# Patient Record
Sex: MALE | Race: WHITE | URBAN - METROPOLITAN AREA
[De-identification: names, ages, dates, MRNs, and addresses within clinical notes are randomized per-mention and may not be internally consistent; named-entity substitution may affect disease eponyms.]

---

## 2021-02-18 RX ORDER — SODIUM CHLORIDE 9 MG/ML
1000 INJECTION, SOLUTION INTRAVENOUS
Refills: 0 | Status: DISCONTINUED | OUTPATIENT
Start: 2021-02-19 | End: 2021-02-19

## 2021-02-18 RX ORDER — OXYCODONE HYDROCHLORIDE 5 MG/1
5 TABLET ORAL ONCE
Refills: 0 | Status: DISCONTINUED | OUTPATIENT
Start: 2021-02-19 | End: 2021-02-19

## 2021-02-18 RX ORDER — FENTANYL CITRATE 50 UG/ML
50 INJECTION INTRAVENOUS
Refills: 0 | Status: DISCONTINUED | OUTPATIENT
Start: 2021-02-19 | End: 2021-02-19

## 2021-02-18 RX ORDER — ONDANSETRON 8 MG/1
4 TABLET, FILM COATED ORAL ONCE
Refills: 0 | Status: DISCONTINUED | OUTPATIENT
Start: 2021-02-19 | End: 2021-02-19

## 2021-02-19 ENCOUNTER — OUTPATIENT (OUTPATIENT)
Dept: INPATIENT UNIT | Facility: HOSPITAL | Age: 51
LOS: 1 days | Discharge: HOME | End: 2021-02-19
Payer: COMMERCIAL

## 2021-02-19 ENCOUNTER — RESULT REVIEW (OUTPATIENT)
Age: 51
End: 2021-02-19

## 2021-02-19 VITALS
TEMPERATURE: 97 F | DIASTOLIC BLOOD PRESSURE: 80 MMHG | SYSTOLIC BLOOD PRESSURE: 142 MMHG | WEIGHT: 169.98 LBS | HEART RATE: 81 BPM | HEIGHT: 75 IN | OXYGEN SATURATION: 100 % | RESPIRATION RATE: 14 BRPM

## 2021-02-19 VITALS
DIASTOLIC BLOOD PRESSURE: 64 MMHG | SYSTOLIC BLOOD PRESSURE: 111 MMHG | TEMPERATURE: 98 F | RESPIRATION RATE: 18 BRPM | HEART RATE: 83 BPM | OXYGEN SATURATION: 100 %

## 2021-02-19 DIAGNOSIS — M75.42 IMPINGEMENT SYNDROME OF LEFT SHOULDER: ICD-10-CM

## 2021-02-19 DIAGNOSIS — M25.9 JOINT DISORDER, UNSPECIFIED: Chronic | ICD-10-CM

## 2021-02-19 PROCEDURE — 88304 TISSUE EXAM BY PATHOLOGIST: CPT

## 2021-02-19 PROCEDURE — 88304 TISSUE EXAM BY PATHOLOGIST: CPT | Mod: 26

## 2021-02-19 PROCEDURE — C1713: CPT

## 2021-02-19 RX ORDER — APREPITANT 80 MG/1
40 CAPSULE ORAL ONCE
Refills: 0 | Status: COMPLETED | OUTPATIENT
Start: 2021-02-19 | End: 2021-02-19

## 2021-02-19 RX ORDER — ONDANSETRON 8 MG/1
1 TABLET, FILM COATED ORAL
Qty: 15 | Refills: 0
Start: 2021-02-19 | End: 2021-02-23

## 2021-02-19 RX ORDER — ASPIRIN/CALCIUM CARB/MAGNESIUM 324 MG
1 TABLET ORAL
Qty: 14 | Refills: 0
Start: 2021-02-19 | End: 2021-03-04

## 2021-02-19 RX ORDER — DOCUSATE SODIUM 100 MG
1 CAPSULE ORAL
Qty: 28 | Refills: 0
Start: 2021-02-19 | End: 2021-03-04

## 2021-02-19 RX ORDER — SODIUM CHLORIDE 9 MG/ML
1000 INJECTION, SOLUTION INTRAVENOUS
Refills: 0 | Status: DISCONTINUED | OUTPATIENT
Start: 2021-02-19 | End: 2021-02-19

## 2021-02-19 RX ADMIN — APREPITANT 40 MILLIGRAM(S): 80 CAPSULE ORAL at 08:31

## 2021-02-19 NOTE — ASU DISCHARGE PLAN (ADULT/PEDIATRIC) - ASU DC SPECIAL INSTRUCTIONSFT
Follow up with Dr Ley in 7-10 days. Call office for appointment. Take medications as prescribed. Keep dressing clean, dry, and intact. Rest, ice, and elevate affected extremity.  NON Weightbearing Left upper extremity  take aspirin 81mg once a day for 2 weeks Shoulder Arthroscopy Instructions (and/or Arthroscopic Labral Repair)    1) Your Shoulder will swell over the next 48hours and you can expect pain to get a bit worse. Your hand may swell also. Ice your shoulder plenty, continuously if possible. Fill up a plastic bag with ice, then wrap it in a towel or pillow case.     2) ACTIVITY: Elevate your hand and wiggle your fingers often (10x per hour). NO LIFTING ANYTHING with the arm. Be up and walking as much as you can tolereate. The more you move the better you will do.     3) BANDAGE: Expect some mild bloody drainage. It is normal. It may soak through the gauze and ACE bandage. This is mostly leftover Saline fluid coming out from surgery.     4) SHOWER: Remove bandage in 48hrs and place a Waterproof Band Aide over each of the 3 incisions. You can shower in 48 hours. No bath. Pat your incisions dry. No creams, no lotions.    5) Only reason to worry would be if pain got so severe that you cannot feel or wiggle your fingers. In this case you need to call or come to the ER. But as long as you can feel and wiggle your fingers, you are fine.    6) Call the office to schedule a follow up appointment to see Dr. EISENBERG in 10-14 days. Your Sutures will be removed at that point.    7) A pain Rx is in the chart; fill it on your way home. OR was sent electronically to your pharmacy, pick it up on the way home.    8) Take a full Aspirn EC 81mg daily for 2 weeks. This is to prevent blood clots.

## 2021-02-19 NOTE — ASU DISCHARGE PLAN (ADULT/PEDIATRIC) - CARE PROVIDER_API CALL
Lokesh Ley)  Orthopaedic Surgery  [address] [place] [address] [place] [address]  Phone: [phone]  Fax: [phone]  Follow Up Time:

## 2021-02-23 DIAGNOSIS — S43.432A SUPERIOR GLENOID LABRUM LESION OF LEFT SHOULDER, INITIAL ENCOUNTER: ICD-10-CM

## 2021-02-23 DIAGNOSIS — M75.42 IMPINGEMENT SYNDROME OF LEFT SHOULDER: ICD-10-CM
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
## 2021-05-07 PROBLEM — S49.90XA UNSPECIFIED INJURY OF SHOULDER AND UPPER ARM, UNSPECIFIED ARM, INITIAL ENCOUNTER: Chronic | Status: ACTIVE | Noted: 2021-02-19

## 2021-05-08 ENCOUNTER — APPOINTMENT (OUTPATIENT)
Dept: DISASTER EMERGENCY | Facility: CLINIC | Age: 51
End: 2021-05-08

## 2021-05-08 DIAGNOSIS — Z01.818 ENCOUNTER FOR OTHER PREPROCEDURAL EXAMINATION: ICD-10-CM

## 2021-05-08 PROBLEM — Z00.00 ENCOUNTER FOR PREVENTIVE HEALTH EXAMINATION: Status: ACTIVE | Noted: 2021-05-08

## 2021-05-09 LAB — SARS-COV-2 N GENE NPH QL NAA+PROBE: NOT DETECTED

## 2021-05-11 ENCOUNTER — OUTPATIENT (OUTPATIENT)
Dept: INPATIENT UNIT | Facility: HOSPITAL | Age: 51
LOS: 1 days | Discharge: HOME | End: 2021-05-11
Payer: COMMERCIAL

## 2021-05-11 ENCOUNTER — RESULT REVIEW (OUTPATIENT)
Age: 51
End: 2021-05-11

## 2021-05-11 VITALS
DIASTOLIC BLOOD PRESSURE: 78 MMHG | RESPIRATION RATE: 16 BRPM | OXYGEN SATURATION: 100 % | SYSTOLIC BLOOD PRESSURE: 131 MMHG | HEART RATE: 86 BPM

## 2021-05-11 VITALS
HEART RATE: 70 BPM | OXYGEN SATURATION: 100 % | HEIGHT: 75 IN | DIASTOLIC BLOOD PRESSURE: 88 MMHG | WEIGHT: 175.05 LBS | SYSTOLIC BLOOD PRESSURE: 140 MMHG | RESPIRATION RATE: 17 BRPM | TEMPERATURE: 98 F

## 2021-05-11 DIAGNOSIS — M25.612 STIFFNESS OF LEFT SHOULDER, NOT ELSEWHERE CLASSIFIED: ICD-10-CM

## 2021-05-11 DIAGNOSIS — M25.9 JOINT DISORDER, UNSPECIFIED: Chronic | ICD-10-CM

## 2021-05-11 PROCEDURE — 29823 SHO ARTHRS SRG XTNSV DBRDMT: CPT | Mod: AS

## 2021-05-11 PROCEDURE — 88304 TISSUE EXAM BY PATHOLOGIST: CPT | Mod: 26

## 2021-05-11 PROCEDURE — 88304 TISSUE EXAM BY PATHOLOGIST: CPT

## 2021-05-11 RX ORDER — SODIUM CHLORIDE 9 MG/ML
1000 INJECTION, SOLUTION INTRAVENOUS
Refills: 0 | Status: DISCONTINUED | OUTPATIENT
Start: 2021-05-11 | End: 2021-05-11

## 2021-05-11 RX ORDER — FAMOTIDINE 10 MG/ML
20 INJECTION INTRAVENOUS ONCE
Refills: 0 | Status: COMPLETED | OUTPATIENT
Start: 2021-05-11 | End: 2021-05-11

## 2021-05-11 RX ORDER — SODIUM CHLORIDE 9 MG/ML
3 INJECTION INTRAMUSCULAR; INTRAVENOUS; SUBCUTANEOUS EVERY 8 HOURS
Refills: 0 | Status: DISCONTINUED | OUTPATIENT
Start: 2021-05-11 | End: 2021-05-11

## 2021-05-11 RX ORDER — FENTANYL CITRATE 50 UG/ML
50 INJECTION INTRAVENOUS
Refills: 0 | Status: DISCONTINUED | OUTPATIENT
Start: 2021-05-11 | End: 2021-05-11

## 2021-05-11 RX ORDER — ACETAMINOPHEN 500 MG
975 TABLET ORAL ONCE
Refills: 0 | Status: COMPLETED | OUTPATIENT
Start: 2021-05-11 | End: 2021-05-11

## 2021-05-11 RX ORDER — ONDANSETRON 8 MG/1
4 TABLET, FILM COATED ORAL ONCE
Refills: 0 | Status: DISCONTINUED | OUTPATIENT
Start: 2021-05-11 | End: 2021-05-11

## 2021-05-11 RX ORDER — OXYCODONE HYDROCHLORIDE 5 MG/1
5 TABLET ORAL ONCE
Refills: 0 | Status: DISCONTINUED | OUTPATIENT
Start: 2021-05-11 | End: 2021-05-11

## 2021-05-11 RX ADMIN — FENTANYL CITRATE 50 MICROGRAM(S): 50 INJECTION INTRAVENOUS at 18:36

## 2021-05-11 RX ADMIN — FENTANYL CITRATE 50 MICROGRAM(S): 50 INJECTION INTRAVENOUS at 17:55

## 2021-05-11 RX ADMIN — FENTANYL CITRATE 50 MICROGRAM(S): 50 INJECTION INTRAVENOUS at 18:15

## 2021-05-11 RX ADMIN — SODIUM CHLORIDE 100 MILLILITER(S): 9 INJECTION, SOLUTION INTRAVENOUS at 18:36

## 2021-05-11 RX ADMIN — FENTANYL CITRATE 50 MICROGRAM(S): 50 INJECTION INTRAVENOUS at 18:05

## 2021-05-11 RX ADMIN — FENTANYL CITRATE 50 MICROGRAM(S): 50 INJECTION INTRAVENOUS at 18:25

## 2021-05-11 RX ADMIN — OXYCODONE HYDROCHLORIDE 5 MILLIGRAM(S): 5 TABLET ORAL at 18:14

## 2021-05-11 RX ADMIN — OXYCODONE HYDROCHLORIDE 5 MILLIGRAM(S): 5 TABLET ORAL at 18:36

## 2021-05-11 NOTE — ASU DISCHARGE PLAN (ADULT/PEDIATRIC) - ASU DC SPECIAL INSTRUCTIONSFT
1. Rest/ice prn  2. WBAT LUE, may begin physical therapy on 5/12  3. SHOWER: Remove bandage in 72 hrs and place a Waterproof Band Aide over each of the 3 incisions. You can shower in 72 hours. No bath. Pat your incisions dry. No creams, no lotions.  4. Pain meds prn, rx given  5. F/U with Dr Ley in 10-14 days, call office for appt

## 2021-05-19 DIAGNOSIS — M24.612 ANKYLOSIS, LEFT SHOULDER: ICD-10-CM

## 2021-05-19 DIAGNOSIS — M75.02 ADHESIVE CAPSULITIS OF LEFT SHOULDER: ICD-10-CM

## 2021-05-19 DIAGNOSIS — M65.812 OTHER SYNOVITIS AND TENOSYNOVITIS, LEFT SHOULDER: ICD-10-CM

## 2023-02-16 NOTE — ASU PREOP CHECKLIST - PATIENT'S PERSONAL PROPERTY GIVEN TO
Zaria Kenny called to request a refill on his medication. Last office visit : 12/9/22  Next office visit : 6/9/2023     Last UDS:   Amphetamine Screen, Urine   Date Value Ref Range Status   12/09/2022 neg  Final     Barbiturate Screen, Urine   Date Value Ref Range Status   12/09/2022 neg  Final     Benzodiazepine Screen, Urine   Date Value Ref Range Status   12/09/2022 pos  Final     Buprenorphine Urine   Date Value Ref Range Status   12/09/2022 neg  Final     Cocaine Metabolite Screen, Urine   Date Value Ref Range Status   12/09/2022 neg  Final     Gabapentin Screen, Urine   Date Value Ref Range Status   12/09/2022 neg  Final     MDMA, Urine   Date Value Ref Range Status   12/09/2022 neg  Final     Methamphetamine, Urine   Date Value Ref Range Status   12/09/2022 neg  Final     Opiate Scrn, Ur   Date Value Ref Range Status   12/09/2022 pos  Final     Oxycodone Screen, Ur   Date Value Ref Range Status   12/09/2022 neg  Final     PCP Screen, Urine   Date Value Ref Range Status   12/09/2022 neg  Final     Propoxyphene Screen, Urine   Date Value Ref Range Status   12/09/2022 neg  Final     THC Screen, Urine   Date Value Ref Range Status   12/09/2022 neg  Final     Tricyclic Antidepressants, Urine   Date Value Ref Range Status   12/09/2022 neg  Final       Last Ryder Marchi: 10/18/22  Medication Contract: 12/22   Last Fill:   1/16/23  Requested Prescriptions     Pending Prescriptions Disp Refills    HYDROcodone-acetaminophen (NORCO) 7.5-325 MG per tablet 30 tablet 0     Sig: Take 1 tablet by mouth every 6 hours as needed for Pain for up to 30 days. Please approve or refuse this medication.    Libby Justice MA asu locker 2A/on unit

## 2025-03-21 NOTE — ASU PATIENT PROFILE, ADULT - TOBACCO USE
Outreach attempt was made to schedule a Medicare Wellness Visit. This was the second attempt. Contact was made, no appointment scheduled. Patient will call back at a later time to schedule says has too much going on right now.    
Never smoker